# Patient Record
Sex: MALE | Race: AMERICAN INDIAN OR ALASKA NATIVE | URBAN - NONMETROPOLITAN AREA
[De-identification: names, ages, dates, MRNs, and addresses within clinical notes are randomized per-mention and may not be internally consistent; named-entity substitution may affect disease eponyms.]

---

## 2017-06-11 ENCOUNTER — HISTORY (OUTPATIENT)
Dept: EMERGENCY MEDICINE | Facility: OTHER | Age: 32
End: 2017-06-11

## 2018-01-24 ENCOUNTER — DOCUMENTATION ONLY (OUTPATIENT)
Dept: FAMILY MEDICINE | Facility: OTHER | Age: 33
End: 2018-01-24

## 2019-10-13 ENCOUNTER — HOSPITAL ENCOUNTER (EMERGENCY)
Dept: HOSPITAL 11 - JP.ED | Age: 34
Discharge: HOME | End: 2019-10-13
Payer: MEDICAID
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
## 2019-10-13 DIAGNOSIS — S01.81XA: Primary | ICD-10-CM

## 2019-10-13 DIAGNOSIS — F10.120: ICD-10-CM

## 2019-10-13 PROCEDURE — G0480 DRUG TEST DEF 1-7 CLASSES: HCPCS

## 2019-10-13 NOTE — CRLCT
INDICATION:



Trauma



TECHNIQUE:



CT maxillofacial without contrast.



COMPARISON:



None 



FINDINGS:



Facial bones: No fractures or bone lesions.  Specifically the nasal bones, 

temporomandibular joints, maxilla and mandible appear intact.  



Orbits and globes: Unremarkable.  



Sinuses: No acute or significant findings.  



Soft tissues: Small forehead contusion. 



IMPRESSION:



No facial bone fracture or subluxation. Small forehead contusion.



Please note that all CT scans at this facility use dose modulation, 

iterative reconstruction, and/or weight-based dosing when appropriate to 

reduce radiation dose to as low as reasonably achievable.



Dictated by Jannette Lloyd MD @ Oct 13 2019  1:14AM



(Electronically Signed)
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
## 2019-10-14 NOTE — CRLCT
----- ADDENDUM -----

Addendum: 



INDICATION:

Trauma



TECHNIQUE:

Head CT without contrast.



COMPARISON:

None 



FINDINGS:

CSF spaces: Within normal limits for age. 



Brain parenchyma: Normal gray-white junction. No sign of mass, hemorrhage, or 
midline shift. 



Skull base and calvarium: The visualized paranasal sinuses and mastoid air 
cells demonstrate no acute or significant findings. The visualized orbits are 
grossly unremarkable. No skull fractures. Small forehead contusion. 



IMPRESSION:

No acute intracranial hemorrhage or skull fracture. Small forehead contusion.

Dictated by Jannette Lloyd MD @ Oct 13 2019 1:16AM

Signed by: Jannette Lloyd MD @10/13/2019 1:28:28 AM

(Electronic Signature)
MTDD